# Patient Record
Sex: FEMALE | Race: WHITE | NOT HISPANIC OR LATINO | Employment: UNEMPLOYED
[De-identification: names, ages, dates, MRNs, and addresses within clinical notes are randomized per-mention and may not be internally consistent; named-entity substitution may affect disease eponyms.]

---

## 2017-11-26 ENCOUNTER — HEALTH MAINTENANCE LETTER (OUTPATIENT)
Age: 27
End: 2017-11-26

## 2023-07-30 ENCOUNTER — APPOINTMENT (OUTPATIENT)
Dept: GENERAL RADIOLOGY | Facility: HOSPITAL | Age: 33
End: 2023-07-30
Attending: NURSE PRACTITIONER
Payer: COMMERCIAL

## 2023-07-30 ENCOUNTER — HOSPITAL ENCOUNTER (EMERGENCY)
Facility: HOSPITAL | Age: 33
Discharge: HOME | End: 2023-07-30
Attending: NURSE PRACTITIONER | Admitting: NURSE PRACTITIONER
Payer: COMMERCIAL

## 2023-07-30 VITALS
HEART RATE: 111 BPM | SYSTOLIC BLOOD PRESSURE: 114 MMHG | OXYGEN SATURATION: 98 % | RESPIRATION RATE: 20 BRPM | TEMPERATURE: 98.3 F | DIASTOLIC BLOOD PRESSURE: 82 MMHG

## 2023-07-30 DIAGNOSIS — S63.501A WRIST SPRAIN, RIGHT, INITIAL ENCOUNTER: ICD-10-CM

## 2023-07-30 PROCEDURE — G0463 HOSPITAL OUTPT CLINIC VISIT: HCPCS

## 2023-07-30 PROCEDURE — G0463 HOSPITAL OUTPT CLINIC VISIT: HCPCS | Mod: 25

## 2023-07-30 PROCEDURE — 99213 OFFICE O/P EST LOW 20 MIN: CPT | Performed by: NURSE PRACTITIONER

## 2023-07-30 PROCEDURE — 73110 X-RAY EXAM OF WRIST: CPT | Mod: RT

## 2023-07-30 PROCEDURE — 96372 THER/PROPH/DIAG INJ SC/IM: CPT | Performed by: NURSE PRACTITIONER

## 2023-07-30 PROCEDURE — 250N000011 HC RX IP 250 OP 636: Mod: JZ | Performed by: NURSE PRACTITIONER

## 2023-07-30 RX ORDER — KETOROLAC TROMETHAMINE 30 MG/ML
30 INJECTION, SOLUTION INTRAMUSCULAR; INTRAVENOUS ONCE
Status: COMPLETED | OUTPATIENT
Start: 2023-07-30 | End: 2023-07-30

## 2023-07-30 RX ADMIN — KETOROLAC TROMETHAMINE 30 MG: 30 INJECTION, SOLUTION INTRAMUSCULAR; INTRAVENOUS at 21:17

## 2023-07-30 ASSESSMENT — ENCOUNTER SYMPTOMS
NUMBNESS: 1
FEVER: 0
VOMITING: 0
CHILLS: 0
ACTIVITY CHANGE: 1
COLOR CHANGE: 1
NAUSEA: 1

## 2023-07-30 ASSESSMENT — ACTIVITIES OF DAILY LIVING (ADL): ADLS_ACUITY_SCORE: 35

## 2023-07-31 NOTE — ED TRIAGE NOTES
Pt presents with right wrist pain after wresting around with her nephew. No obvious deformity noted in triage. CMS intact. Already has ice applied.     Thomas Jon, MSN, RN on 7/30/2023 at 8:23 PM

## 2023-07-31 NOTE — ED TRIAGE NOTES
Patient presents to urgent care for right wrist pain after wrestling with her nephew landing directly on her wrist. Nephew weighs approx about 110 lb. Ice applied.and tylenol taken prior to arriving.  Patient states she is not taking any medications and has not been on any daily medications for years.

## 2023-07-31 NOTE — ED PROVIDER NOTES
History     Chief Complaint   Patient presents with    Wrist Pain     HPI  Manda Meléndez is a 33 year old female who is accompanied per her brother.  She presents with right wrist/hand pain that occurred this evening when she was wrestling with her nephew and he fell on top of her wrist.  Accompanied with nausea, numbness and tingling and some bruising.  Has applied ice and taken acetaminophen with minimal relief of her discomfort.  Denies previous injuries.  Non-smoker.  Right-hand-dominant.  Denies fevers, chills, and vomiting    Musculoskeletal problem/pain    Duration:  tonight  Description  Location:  right wrist /hand . Right hand dominant  Intensity:  5/10 sharp shooting. intermittent  Accompanying signs and symptoms: numbness, tingling, and discoloration of bruising  History  Previous similar problem: no   Previous evaluation:  none  Precipitating or alleviating factors:  Trauma or overuse: YES- wrestling with nephew and he fell on top of her wrist  Aggravating factors include: movement  Therapies tried and outcome: ice and acetaminophen     Allergies:  Allergies   Allergen Reactions    Adhesive Tape     Benzoyl Peroxide Itching and Rash    Latex Rash       Problem List:    Patient Active Problem List    Diagnosis Date Noted    Gastroesophageal reflux disease, esophagitis presence not specified 11/30/2015     Priority: Medium     IMO Regulatory Load OCT 2020      Major depressive disorder, recurrent episode, mild (H) 10/07/2015     Priority: Medium    Hypothyroidism, unspecified hypothyroidism type 07/02/2015     Priority: Medium    Urge incontinence 05/29/2015     Priority: Medium    OAB (overactive bladder) 05/29/2015     Priority: Medium    Stress incontinence 05/29/2015     Priority: Medium    Contraception 08/19/2014     Priority: Medium    Ventral hernia 05/21/2014     Priority: Medium     Problem list name updated by automated process. Provider to review      Lumbago 08/05/2013     Priority:  Medium        Past Medical History:    Past Medical History:   Diagnosis Date    Endometriosis 5/2/2012    Hashimoto's thyroiditis     Interstitial cystitis 5/2/2012    Irritable bowel syndrome 5/2/2012    NONSPECIFIC MEDICAL HISTORY 2008    Vaginal delivery 3/29/14       Past Surgical History:    Past Surgical History:   Procedure Laterality Date    HERNIORRHAPHY VENTRAL  7/10/2014    Procedure: HERNIORRHAPHY VENTRAL;  Surgeon: Destini Witt MD;  Location: [facility]    LAPAROSCOPIC HERNIORRHAPHY VENTRAL N/A 1/12/2015    Procedure: LAPAROSCOPIC HERNIORRHAPHY VENTRAL;  Surgeon: Destini Witt MD;  Location: [facility]    ovarian cystectomy - laproscopic  2009    right ovary - stable controlled - Provider: Srinivas       Family History:    Family History   Problem Relation Age of Onset    Heart Disease Father         heart disease    Thyroid Disease Mother        Social History:  Marital Status:   [2]  Social History     Tobacco Use    Smoking status: Former    Smokeless tobacco: Never   Substance Use Topics    Alcohol use: No    Drug use: No        Medications:    ALLERGY RELIEF 10 MG tablet  doxylamine (SLEEP AID) 25 MG TABS  hydrocortisone (WESTCORT) 0.2 % cream  ibuprofen (ADVIL,MOTRIN) 800 MG tablet  ketoconazole (NIZORAL) 2 % shampoo  levothyroxine (SYNTHROID, LEVOTHROID) 150 MCG tablet  Magnesium 400 MG CAPS  medroxyPROGESTERone (DEPO-PROVERA) 150 MG/ML injection  multivitamin, therapeutic with minerals (MULTI-VITAMIN) TABS  omeprazole (PRILOSEC) 40 MG capsule  polyethylene glycol (MIRALAX/GLYCOLAX) powder  pyrithione zinc (SELSUN BLUE/HEAD AND SHOULDERS) 1 % SHAM  sertraline (ZOLOFT) 100 MG tablet          Review of Systems   Constitutional:  Positive for activity change. Negative for chills and fever.   Gastrointestinal:  Positive for nausea. Negative for vomiting.   Musculoskeletal:         Right wrist/hand pain   Skin:  Positive for color change (bruising dorsal region of wrist).   Neurological:  Positive  for numbness.       Physical Exam   BP: 114/82  Pulse: 111  Temp: 98.3  F (36.8  C)  Resp: 20  SpO2: 98 %      Physical Exam  Vitals and nursing note reviewed.   Constitutional:       General: She is in acute distress (Moderate).      Appearance: She is overweight.   Cardiovascular:      Rate and Rhythm: Tachycardia present.   Pulmonary:      Effort: Pulmonary effort is normal.   Musculoskeletal:         General: Tenderness and signs of injury present. No swelling.      Right elbow: Tenderness present in olecranon process (No swelling, bruising or erythema.  Normal flexion and extension).      Right forearm: No tenderness.      Right wrist: Tenderness (Over snuff box and distal radius), bony tenderness and snuff box tenderness present. No swelling or crepitus. Decreased range of motion. Normal pulse.      Right hand: No tenderness. Normal range of motion. Normal strength. Normal pulse.        Hands:    Skin:     Findings: Bruising present. No erythema.   Neurological:      Mental Status: She is alert and oriented to person, place, and time.   Psychiatric:         Behavior: Behavior normal.         ED Course                 Procedures             Results for orders placed or performed during the hospital encounter of 07/30/23 (from the past 24 hour(s))   XR Wrist Right G/E 3 Views    Narrative    Exam: XR WRIST RIGHT G/E 3 VIEWS    Technique: Right wrist, 4 views    Comparison: None.    Exam reason: pain over distal radius. nephew fell on top of her wrist    Findings:  No acute fracture or dislocation. Joint spaces are well maintained.      Soft tissues appear normal.      Impression    Impression:  No acute fracture or dislocation.    ANGELINA MCGHEE MD         SYSTEM ID:  [id number]       Medications   ketorolac (TORADOL) injection 30 mg (has no administration in time range)       Assessments & Plan (with Medical Decision Making)     I have reviewed the nursing notes.    I have reviewed the findings, diagnosis,  plan and need for follow up with the patient.  (V00.442J) Wrist sprain, right, initial encounter  Comment: 33 year old female who is accompanied per her brother.  She presents with right wrist/hand pain that occurred this evening when she was wrestling with her nephew and he fell on top of her wrist.  Accompanied with nausea, numbness and tingling and some bruising.  Has applied ice and taken acetaminophen with minimal relief of her discomfort.  Denies previous injuries.  Non-smoker.  Right-hand-dominant.  Denies fevers, chills, and vomiting    MDM:Mild bruising over dorsal region of medial wrist and distal radius.  With palpation in this region.  Radial pulse 3+.  Good thumb to finger opposition and abduction.    Right wrist x-ray reviewed and per radiology; no acute fracture or dislocation    Ketorolac 30 mg given IM    Right wrist splint applied per LPN    Plan: Education provided for wrist sprain.  Keep affected extremity elevated as much as possible for next 24 - 48 hours. Ice to affected area 20 minutes every hour as needed for comfort. After 48 hours you can apply heat.  After 3 AM may start ibuprofen; ibuprofen 600 to 800 mg (3 - 4 tabs of over the counter med) every six to eight hours as needed;not to exceed maximum amount of 3200 mg in 24 hours. Take with food. Tylenol 650 to 1000 mg every four to six hours as needed (not to exceed more than 4000 mg in a 24 hour period). May use interchangeably. Suggest medicating around the clock for the next 24-48 hours. Use wrist splint  until you you move your wrist/hand without having discomfort.   Slowly start to wiggle your fingers and move wrist/hand as often as possible but not beyond the point of pain. Follow up with primary provider  as needed  These discharge instructions and medications were reviewed with her and her brother and understanding verbalized.    This document was prepared using a combination of typing and voice generated software.  While every  attempt was made for accuracy, spelling and grammatical errors may exist.    New Prescriptions    No medications on file       Final diagnoses:   Wrist sprain, right, initial encounter       7/30/2023   [facility]\       Nadja Lazar, CNP  07/30/23 [id number]

## 2023-07-31 NOTE — DISCHARGE INSTRUCTIONS
Keep affected extremity elevated as much as possible for next 24 - 48 hours. Ice to affected area 20 minutes every hour as needed for comfort. After 48 hours you can apply heat.  After 3 AM may start ibuprofen; ibuprofen 600 to 800 mg (3 - 4 tabs of over the counter med) every six to eight hours as needed;not to exceed maximum amount of 3200 mg in 24 hours. Take with food. Tylenol 650 to 1000 mg every four to six hours as needed (not to exceed more than 4000 mg in a 24 hour period). May use interchangeably. Suggest medicating around the clock for the next 24-48 hours. Use wrist splint  until you you move your wrist/hand without having discomfort.   Slowly start to wiggle your fingers and move wrist/hand as often as possible but not beyond the point of pain. Follow up with primary provider  as needed